# Patient Record
(demographics unavailable — no encounter records)

---

## 2017-03-17 NOTE — REP
Left os calcis:  Two views.

 

History:  Pain in the heel.

 

Findings:  Axial and lateral views demonstrate large plantar and Achilles

calcaneal spurs.  No erosive changes seen.  Bones, joints and soft tissues are

otherwise unremarkable.  Achilles tendon contour appears normal.

 

Impression:

 

Extensive Achilles and plantar calcaneal spurring.

## 2017-05-24 NOTE — REP
Clinical:  Cough.

 

Technique:  PA and lateral.

 

Comparison:  None.

 

Findings:

Mediastinum and cardiac silhouette are normal.  Lung fields demonstrate

chronic-appearing changes primarily involving the left lower lung zone.  No focal

consolidation, effusion, or pneumothorax.  Skeletal structures intact.

 

Impression:

Chronic-appearing changes.  No prior examination is available for comparison and

if the patient remains symptomatic, chest CT should be considered for further

investigation.